# Patient Record
Sex: FEMALE | URBAN - METROPOLITAN AREA
[De-identification: names, ages, dates, MRNs, and addresses within clinical notes are randomized per-mention and may not be internally consistent; named-entity substitution may affect disease eponyms.]

---

## 2022-09-23 ENCOUNTER — TELEPHONE (OUTPATIENT)
Dept: BARIATRICS/WEIGHT MGMT | Age: 47
End: 2022-09-23

## 2022-09-23 NOTE — TELEPHONE ENCOUNTER
Online Info Session Completed:  on 9-18-22 okay to schedule with Dr. Carol Hernandez   with [facility]    Patient informed the following: This is NOT a guarantee of payment  When stating that you have a Benefit or Coverage for Bariatric Surgery - that means that you may qualify for the surgery  Bariatric Surgery is considered an elective procedure, patient is responsible to know their benefits . Any information we obtain when calling your insurance  is not  a guarantee of  coverage  and/or  benefit. Appointment Note :   New Patient , bcbs,   3   month visits,  PG Fee $200,  Mailed Packet or advised to arrive  early      Remind Patient of $200 Program fee with $ 100 required at Second visit with office on initial dietician visit. Remind Patient they must be nicotine free. They will be tested at the beginning of the program and prior to surgery. Advise Patient Responsible for out of pocket, copay at medical visits, Deductible and coinsurance applied to medical visits and procedure. You will be responsible for any of the following:  Copays   Deductibles [address], [address]    The items mentioned above are indicated or required by your insurance plan. Your deductible and coinsurance are applied to medical visits and procedures. Radha Sherman

## 2022-10-13 ENCOUNTER — OFFICE VISIT (OUTPATIENT)
Dept: BARIATRICS/WEIGHT MGMT | Age: 47
End: 2022-10-13
Payer: COMMERCIAL

## 2022-10-13 VITALS
SYSTOLIC BLOOD PRESSURE: 113 MMHG | RESPIRATION RATE: 20 BRPM | DIASTOLIC BLOOD PRESSURE: 76 MMHG | BODY MASS INDEX: 36.73 KG/M2 | HEART RATE: 95 BPM | HEIGHT: 67 IN | WEIGHT: 234 LBS

## 2022-10-13 DIAGNOSIS — E66.9 OBESITY (BMI 30-39.9): ICD-10-CM

## 2022-10-13 DIAGNOSIS — E78.00 HYPERCHOLESTEROLEMIA: Primary | ICD-10-CM

## 2022-10-13 DIAGNOSIS — R06.83 SNORING: ICD-10-CM

## 2022-10-13 PROCEDURE — 99203 OFFICE O/P NEW LOW 30 MIN: CPT | Performed by: SURGERY

## 2022-10-13 RX ORDER — BUPROPION HYDROCHLORIDE 300 MG/1
TABLET ORAL
COMMUNITY
Start: 2022-09-05

## 2022-10-13 RX ORDER — DULOXETIN HYDROCHLORIDE 60 MG/1
CAPSULE, DELAYED RELEASE ORAL
COMMUNITY
Start: 2022-10-11

## 2022-10-13 NOTE — PROGRESS NOTES
[facility] [address] [facility] INVASIVE BARIATRIC SURG  [address] [place] [address]  [address] [address]  Dept: [phone]    SURGICAL WEIGHT MANAGEMENT PROGRAM  PROGRESS NOTE INITIAL EVALUATION     Patient: Georgia Teague        Service Date: 10/13/2022      HPI:     Chief Complaint   Patient presents with    Bariatric, Initial Visit    Weight Loss       The patient is a pleasant 56y.o. year old female  with morbid obesity, who stands Height: 5' 7\" (170.2 cm) tall with a weight of Weight: 234 lb (106.1 kg) , resulting in a BMI of Body mass index is 36.65 kg/m². . The patient suffers from multiple co-morbidities as a result of morbid obesity, including: Depression, Anxiety, and elevated cholesterol and triglycerides. She has suffered from obesity for many years. She notes a history of inflammatory rheumatoid arthritis, for which she has taken Mobic in the past. She also reports bilateral lower extremity edema. The patient denies  a history of myocardial infarction, deep vein thrombosis, pulmonary embolism, renal failure, hepatic failure, stroke, and seizure. She reports a history of an appendectomy. Patient is a smoker in remission, and she stopped 5 years ago. The patient has failed multiple attempts at non-surgical weight loss, and is now seeking surgical intervention to promote permanent and consistent weight loss. Medical History:  Past Medical History:   Diagnosis Date    Arthritis     Asthma     Depression        Surgical History:  No past surgical history on file. Family History:  No family history on file.     Social History:   Social History     Tobacco Use    Smoking status: Former     Types: Cigarettes     Quit date: 2017     Years since quittin.8    Smokeless tobacco: Never   Substance Use Topics    Alcohol use: Yes     Comment: WEEKLY    Drug use: Never       Current Med List:  Current Outpatient Medications   Medication Sig Dispense Refill buPROPion (WELLBUTRIN XL) 300 MG extended release tablet take 1 tablet by mouth once daily      DULoxetine (CYMBALTA) 60 MG extended release capsule        No current facility-administered medications for this visit. SOCIAL:      This patient is alone for the evaluation today. [] HIV Risk Factors (i.e.) intravenous drug abuser; at risk sexual behavior; received blood products    [] TB Risk Factors (i.e.) Medically underserved, institutional care, foreign born, endemic area; exposure to active case    [] Hepatitis B&C Risk Factors (i.e.) Received blood transfusion prior to 1992; recreational drug use; high risk sexual behaviors; tattoos or body piercings; contact with blood or needle sticks in the workplace    Comprehension    Ability to grasp concepts and respond to questions:   [x] High   [] Medium   [] Low    Motivation    [x] Asks Questions; eager to learn   [] Needs education   [] Extreme anxiety    [] uncooperative   [] Denies need for education    English Speaking Ability    [x] Speaks English well   [x] Reads English well   [x] Understands spoken english    [x] Understands written English   [] No need for interpretive support      [] Might benefit from interpretive support   []  required for all services     REVIEW OF SYSTEMS: (Negative unless marked otherwise)     See review of Systems scanned into media    PRESENT ILLNESS:     Weight Parameters  Weight 234 lb (106.1 kg)   Height 5' 7\" (1.702 m)   BMI Body mass index is 36.65 kg/m².    IBW     EBW               IMMUNIZATION STATUS  Immunization History   Administered Date(s) Administered    COVID-19, J&J, (age 18y+), IM, 0.5 mL 05/24/2021       FALLS ASSESSMENT    [x] LOW RISK FOR FALLS    [] MODERATE RISK FOR FALLS    [] Difficulty walking/selfcare    [] Falls in the past 2 months    [] Suspicion of Clinician    [] Other:      SMOKING CESSATION     [x] Not needed     [] Instructed to stop smoking    [] Pamphlet community resources given     VTE SCREEN    [] Family hx DVT/PE  /   [] Personal hx of DVT/PE    [x] Denies any family or personal hx of DVT/PE    Physician Review    [x] Past medical, family, & social history reviewed and discussed with patient. Review of surgery and post-surgical changes (by surgeon for surgical patients only)    [x] Lifelong diet expectations reviewed with patient    [x] Need for lifelong vitamin supplementation reviewed with patient    PHYSICAL EXAMINATION:      /76 (Site: Left Upper Arm, Position: Sitting, Cuff Size: Large Adult)   Pulse 95   Resp 20   Ht 5' 7\" (1.702 m)   Wt 234 lb (106.1 kg)   LMP 10/13/2022   BMI 36.65 kg/m²     Constitutional:  Vital signs are normal. The patient appears well-developed   HEENT:      Head: Normocephalic. Atraumatic     Eyes: pupils are equal and reactive. No scleral icterus is present. Neck: No mass and no thyromegaly present. Cardiovascular: Normal rate, regular rhythm, S1 normal and S2 normal.  Bilateral pulses present. Pulmonary/Chest: Effort normal and breath sounds normal. No retractions. Abdominal: Soft. Normal appearance. There is no organomegaly. No tenderness. There is no rigidity, no rebound, no guarding and no Matthews's sign. Musculoskeletal:      Right lower leg: Normal. No tenderness and no edema. Left lower leg: Normal. No tenderness and no edema. Lymphadenopathy:     No cervical adenopathy, No Exrtemity Adenopathy. Neurological: The patient is alert and oriented. Moving all four extremities equally, sensation grossly intact bilateral.  Skin: Skin is warm, dry and intact. Psychiatric: The patient has a normal mood and affect.  Speech is normal and behavior is normal. Judgment and thought content normal. Cognition and memory are normal.     RECOMMENDATIONS:     We spent a great deal of time discussing the risks and benefits of Ariane-en-Y Gastric Bypass and Sleeve Gastrectomy, including but not limited to injury to intra-abdominal agree that the record reflects my personal performance and is accurate and complete.     Electronically Signed: Gosia Lin DO. 10/19/22. 7:09 PM.

## 2022-11-04 DIAGNOSIS — R06.83 SNORING: Primary | ICD-10-CM

## 2022-11-04 DIAGNOSIS — E66.9 OBESITY (BMI 30-39.9): ICD-10-CM

## 2022-11-04 DIAGNOSIS — E78.00 HYPERCHOLESTEROLEMIA: ICD-10-CM

## 2022-11-17 ENCOUNTER — TELEPHONE (OUTPATIENT)
Dept: BARIATRICS/WEIGHT MGMT | Age: 47
End: 2022-11-17

## 2023-01-10 ENCOUNTER — HOSPITAL ENCOUNTER (OUTPATIENT)
Dept: SLEEP CENTER | Age: 48
Discharge: HOME | End: 2023-01-12
Payer: COMMERCIAL

## 2023-01-10 DIAGNOSIS — R06.83 SNORING: ICD-10-CM

## 2023-01-10 DIAGNOSIS — E78.00 HYPERCHOLESTEROLEMIA: ICD-10-CM

## 2023-01-10 DIAGNOSIS — E66.9 OBESITY (BMI 30-39.9): ICD-10-CM

## 2023-01-10 PROCEDURE — G0399 HOME SLEEP TEST/TYPE 3 PORTA: HCPCS

## 2023-01-31 ENCOUNTER — PATIENT MESSAGE (OUTPATIENT)
Dept: BARIATRICS/WEIGHT MGMT | Age: 48
End: 2023-01-31

## 2023-01-31 NOTE — TELEPHONE ENCOUNTER
From: Gela Ortega  To: Dr. Arleth Garcia: 1/31/2023 2:47 PM EST  Subject: Question regarding HOME SLEEP STUDY    When should I get my results?  Thank you

## 2023-05-17 ENCOUNTER — OFFICE VISIT (OUTPATIENT)
Dept: PRIMARY CARE CLINIC | Age: 48
End: 2023-05-17

## 2023-05-17 VITALS
BODY MASS INDEX: 37.46 KG/M2 | TEMPERATURE: 98.1 F | DIASTOLIC BLOOD PRESSURE: 70 MMHG | WEIGHT: 239.2 LBS | HEART RATE: 80 BPM | OXYGEN SATURATION: 97 % | SYSTOLIC BLOOD PRESSURE: 110 MMHG

## 2023-05-17 DIAGNOSIS — R05.1 ACUTE COUGH: ICD-10-CM

## 2023-05-17 DIAGNOSIS — J18.9 PNEUMONIA OF LEFT LOWER LOBE DUE TO INFECTIOUS ORGANISM: Primary | ICD-10-CM

## 2023-05-17 DIAGNOSIS — R06.2 WHEEZING: ICD-10-CM

## 2023-05-17 RX ORDER — PREDNISONE 20 MG/1
TABLET ORAL
Qty: 18 TABLET | Refills: 0 | Status: SHIPPED | OUTPATIENT
Start: 2023-05-17 | End: 2023-05-27

## 2023-05-17 RX ORDER — LEVOFLOXACIN 500 MG/1
500 TABLET, FILM COATED ORAL DAILY
Qty: 7 TABLET | Refills: 0 | Status: SHIPPED | OUTPATIENT
Start: 2023-05-17 | End: 2023-05-24

## 2023-05-17 RX ORDER — ALBUTEROL SULFATE 90 UG/1
2 AEROSOL, METERED RESPIRATORY (INHALATION) 4 TIMES DAILY PRN
Qty: 18 G | Refills: 0 | Status: SHIPPED | OUTPATIENT
Start: 2023-05-17

## 2023-05-17 RX ORDER — ALBUTEROL SULFATE 2.5 MG/3ML
2.5 SOLUTION RESPIRATORY (INHALATION) ONCE
Status: COMPLETED | OUTPATIENT
Start: 2023-05-17 | End: 2023-05-17

## 2023-05-17 RX ORDER — FUROSEMIDE 20 MG/1
TABLET ORAL
COMMUNITY
Start: 2023-04-27

## 2023-05-17 RX ADMIN — Medication 0.5 MG: at 15:39

## 2023-05-17 RX ADMIN — ALBUTEROL SULFATE 2.5 MG: 2.5 SOLUTION RESPIRATORY (INHALATION) at 15:38

## 2023-05-17 NOTE — PROGRESS NOTES
Subjective:  Samy Craig presents for   Chief Complaint   Patient presents with    Cough     Started 10 days ago, fever has been around 100 on and off. Nasal Congestion    Headache    Fever     Having temp of 100    Fluids are down    No sob    Nose is slgithy runny    Otc not real helpful    No known exposure    She hears noises in her chest when she breaths  There is no problem list on file for this patient. Objective:  Physical Exam   Vitals: Wt Readings from Last 3 Encounters:   05/17/23 239 lb 3.2 oz (108.5 kg)   10/13/22 234 lb (106.1 kg)     Ht Readings from Last 3 Encounters:   10/13/22 5' 7\" (1.702 m)     Body mass index is 37.46 kg/m². Vitals:    05/17/23 1505   BP: 110/70   Site: Left Upper Arm   Position: Sitting   Cuff Size: Medium Adult   Pulse: 80   Temp: 98.1 °F (36.7 °C)   SpO2: 97%   Weight: 239 lb 3.2 oz (108.5 kg)       Constitutional: She is oriented to person, place, and time. She appears well-developed and well-nourished and in no acute distress. Answers all my questions appropriately. Head: Normocephalic and atraumatic. Eyes:conjunctiva appear normal.    Right Ear: External ear normal. TM is clear  Left Ear: External ear normal. TM is clear    Nose: pink, non-edematous mucosa. No polyps. No septal deviation    Throat: no erythema, tonsillar hypertrophy or exudate. No ulcerations noted. Lips/Teeth/Gums all appear normal.    Neck: Normal range of motion. Neck supple. No tracheal deviation present. No abnormal lymphadenopathy. No JVD noted. Carotids are clear bilaterally. No thyroid masses noted. Heart: RRR without murmur. No S3, S4, or gallop noted. Chest:   decreased breath sounds present. She has bilateral mild wheezing present. No respiratory retractions noted. Wall has symmetrical movement with respirations. After nebulizer treatment had improved air movement.   Now has wheezing rhonchi on the left bases/ mid lobe only  Assessment:   Encounter

## 2024-11-11 ENCOUNTER — OFFICE VISIT (OUTPATIENT)
Dept: PRIMARY CARE CLINIC | Age: 49
End: 2024-11-11
Payer: COMMERCIAL

## 2024-11-11 VITALS
HEIGHT: 67 IN | OXYGEN SATURATION: 96 % | HEART RATE: 81 BPM | DIASTOLIC BLOOD PRESSURE: 76 MMHG | SYSTOLIC BLOOD PRESSURE: 112 MMHG | TEMPERATURE: 97.3 F | BODY MASS INDEX: 31.08 KG/M2 | WEIGHT: 198 LBS

## 2024-11-11 DIAGNOSIS — B34.9 VIRAL ILLNESS: Primary | ICD-10-CM

## 2024-11-11 DIAGNOSIS — J02.9 SORE THROAT: ICD-10-CM

## 2024-11-11 DIAGNOSIS — H92.01 ACUTE OTALGIA, RIGHT: ICD-10-CM

## 2024-11-11 LAB — S PYO AG THROAT QL: NORMAL

## 2024-11-11 PROCEDURE — 87880 STREP A ASSAY W/OPTIC: CPT | Performed by: NURSE PRACTITIONER

## 2024-11-11 PROCEDURE — 99213 OFFICE O/P EST LOW 20 MIN: CPT | Performed by: NURSE PRACTITIONER

## 2024-11-11 ASSESSMENT — ENCOUNTER SYMPTOMS
RHINORRHEA: 0
CHEST TIGHTNESS: 0
TROUBLE SWALLOWING: 1
SORE THROAT: 1
NAUSEA: 0
VOMITING: 0
WHEEZING: 0
SWOLLEN GLANDS: 1
EYE PAIN: 0
EYE REDNESS: 0
COUGH: 0
SINUS PRESSURE: 0
ABDOMINAL PAIN: 0
CHANGE IN BOWEL HABIT: 0
SHORTNESS OF BREATH: 0

## 2024-11-11 NOTE — PROGRESS NOTES
[facility] W[facility]  [address],  [address]  [facility] [address]  Dept: [phone]    Cynthia Seaver is a 49 y.o. female Established patient, who presents to the walk-in clinic today with conditions/complaints as noted below:    Chief Complaint   Patient presents with    Sore Throat     Sx started 11/8/24. Denies fever. Took tylenol otc.    Ear Fullness    Fatigue    Chills         HPI:     Patient presents to walk in clinic with concerns about acute illness. Reports sore throat, swollen glands and ear pains that started Friday. Hurts to swallow. Not eating much but drinking ok. No fevers. Denies runny nose, congestion or PND. Has been using OTC Tylenol.     Pharyngitis  This is a new problem. The current episode started in the past 7 days. Associated symptoms include chills, fatigue, a sore throat and swollen glands. Pertinent negatives include no abdominal pain, change in bowel habit, chest pain, congestion, coughing, fever, headaches, myalgias, nausea, rash, vomiting or weakness. She has tried acetaminophen for the symptoms.       Past Medical History:   Diagnosis Date    Arthritis     Asthma     Depression        Current Outpatient Medications   Medication Sig Dispense Refill    furosemide (LASIX) 20 MG tablet       albuterol sulfate HFA (VENTOLIN HFA) 108 (90 Base) MCG/ACT inhaler Inhale 2 puffs into the lungs 4 times daily as needed for Wheezing 18 g 0    buPROPion (WELLBUTRIN XL) 300 MG extended release tablet take 1 tablet by mouth once daily      DULoxetine (CYMBALTA) 60 MG extended release capsule        No current facility-administered medications for this visit.       No Known Allergies    :     Review of Systems   Constitutional:  Positive for appetite change, chills and fatigue. Negative for fever.   HENT:  Positive for ear pain (right worse than left), sore throat and trouble swallowing. Negative for congestion, ear